# Patient Record
Sex: MALE | Race: WHITE | NOT HISPANIC OR LATINO | Employment: FULL TIME | URBAN - METROPOLITAN AREA
[De-identification: names, ages, dates, MRNs, and addresses within clinical notes are randomized per-mention and may not be internally consistent; named-entity substitution may affect disease eponyms.]

---

## 2023-01-11 ENCOUNTER — OFFICE VISIT (OUTPATIENT)
Dept: FAMILY MEDICINE CLINIC | Facility: CLINIC | Age: 33
End: 2023-01-11
Payer: COMMERCIAL

## 2023-01-11 VITALS
OXYGEN SATURATION: 98 % | BODY MASS INDEX: 21.53 KG/M2 | HEART RATE: 76 BPM | RESPIRATION RATE: 16 BRPM | SYSTOLIC BLOOD PRESSURE: 120 MMHG | TEMPERATURE: 98.6 F | HEIGHT: 69 IN | DIASTOLIC BLOOD PRESSURE: 82 MMHG | WEIGHT: 145.4 LBS

## 2023-01-11 DIAGNOSIS — M54.2 ACUTE NECK PAIN: ICD-10-CM

## 2023-01-11 DIAGNOSIS — Z00.00 ENCOUNTER FOR PREVENTIVE CARE: ICD-10-CM

## 2023-01-11 DIAGNOSIS — Z00.00 ENCOUNTER FOR MEDICAL EXAMINATION TO ESTABLISH CARE: ICD-10-CM

## 2023-01-11 DIAGNOSIS — Z00.00 ANNUAL PHYSICAL EXAM: Primary | ICD-10-CM

## 2023-01-11 PROCEDURE — 99395 PREV VISIT EST AGE 18-39: CPT | Performed by: STUDENT IN AN ORGANIZED HEALTH CARE EDUCATION/TRAINING PROGRAM

## 2023-01-11 PROCEDURE — 99213 OFFICE O/P EST LOW 20 MIN: CPT | Performed by: STUDENT IN AN ORGANIZED HEALTH CARE EDUCATION/TRAINING PROGRAM

## 2023-01-17 NOTE — PROGRESS NOTES
Chief Complaint  Pt presented to clinic with   Chief Complaint   Patient presents with   • Neck Pain     NEW PT HERE TO ESTABLISH- HAVING NECK PAIN SINCE Saturday AND FELT SOMETHING POP ON Saturday WHILE DOING SOMETHING AT HOME WITH HIS DAUGHTER          History of Present Illness  Mr. Fitzgerald 32-year-old male who presented to the clinic for the first time for establishing medical care and annual physical exam.  Patient complain of having neck pain.  Neck pain is acute in nature and started since last Saturday.  Pain is persistent 4 x 10, not associated with any radiation to any upper extremities.  Denies having any trauma.  Denies having any abnormal sensation which include numbness in any of the upper extremities, denies dizziness/headache/nausea and vomiting  Review of Systems   Constitutional: Negative for appetite change, fatigue and unexpected weight change.   HENT: Negative for congestion, rhinorrhea, sore throat, trouble swallowing and voice change.    Respiratory: Negative for chest tightness, shortness of breath and wheezing.    Cardiovascular: Negative for chest pain and palpitations.   Gastrointestinal: Negative for abdominal distention, abdominal pain, diarrhea, nausea and vomiting.   Genitourinary: Negative for decreased urine volume and flank pain.   Musculoskeletal: Positive for neck pain. Negative for arthralgias and myalgias.   Skin: Negative for wound.   Neurological: Negative for tremors, weakness and headaches.   Hematological: Negative for adenopathy.   Psychiatric/Behavioral: Negative for dysphoric mood and sleep disturbance.       PMH:   No outpatient medications prior to visit.     No facility-administered medications prior to visit.      No Known Allergies  Past Surgical History:   Procedure Laterality Date   • LEG SURGERY Left      family history includes No Known Problems in his father and mother.   reports that he has never smoked. He has never used smokeless tobacco. He reports current  "alcohol use. He reports that he does not use drugs.     /82   Pulse 76   Temp 98.6 °F (37 °C) (Infrared)   Resp 16   Ht 175.3 cm (69\")   Wt 66 kg (145 lb 6.4 oz)   SpO2 98%   BMI 21.47 kg/m²   Physical Exam  HENT:      Head: Normocephalic and atraumatic.      Mouth/Throat:      Mouth: Mucous membranes are moist.      Pharynx: Oropharynx is clear.   Eyes:      Extraocular Movements: Extraocular movements intact.      Conjunctiva/sclera: Conjunctivae normal.      Pupils: Pupils are equal, round, and reactive to light.   Cardiovascular:      Rate and Rhythm: Normal rate and regular rhythm.   Pulmonary:      Effort: Pulmonary effort is normal.      Breath sounds: Normal breath sounds.   Abdominal:      General: Bowel sounds are normal.      Palpations: Abdomen is soft.   Musculoskeletal:         General: Normal range of motion.      Cervical back: Neck supple.      Comments: Neck range of motion completely normal, no spinal tenderness appreciated   Skin:     General: Skin is warm.      Capillary Refill: Capillary refill takes less than 2 seconds.   Neurological:      General: No focal deficit present.      Mental Status: He is alert and oriented to person, place, and time. Mental status is at baseline.   Psychiatric:         Mood and Affect: Mood normal.          Diagnoses and all orders for this visit:    1. Annual physical exam (Primary)  -     CBC Auto Differential  -     Comprehensive Metabolic Panel  -     Lipid Panel With / Chol / HDL Ratio  -     TSH    2. Acute neck pain  -     XR Spine Cervical 2 or 3 View  -     Ambulatory Referral to Physical Therapy Evaluate and treat; Stretching, Strengthening    3. Encounter for medical examination to establish care    4. Encounter for preventive care  Comments:  Encouraged to get COVID vaccination, Tdap shot, flu shot, patient stated he will get it from his pharmacy    Labs have been ordered, will follow up  For acute neck pain  Advised patient to do " exercise, use topical Voltaren gel over-the-counter, NSAIDs as needed for neck pain  X-ray spine ordered, will follow up  Physical therapy referral given  RTC if pain persist or get worse    Needs yearly Flu vaccine  Dental visit and exam every year  Seat Belt always when driving or riding cars  Safe sex life to avoid STD  Healthy heart diet  Exercise 3 times a week    Follow Up  3 months